# Patient Record
Sex: FEMALE | Race: BLACK OR AFRICAN AMERICAN | NOT HISPANIC OR LATINO | Employment: UNEMPLOYED | URBAN - METROPOLITAN AREA
[De-identification: names, ages, dates, MRNs, and addresses within clinical notes are randomized per-mention and may not be internally consistent; named-entity substitution may affect disease eponyms.]

---

## 2024-05-22 ENCOUNTER — HOSPITAL ENCOUNTER (OUTPATIENT)
Facility: CLINIC | Age: 39
Setting detail: OBSERVATION
Discharge: HOME | End: 2024-05-23
Attending: EMERGENCY MEDICINE | Admitting: HOSPITALIST
Payer: MEDICAID

## 2024-05-22 ENCOUNTER — APPOINTMENT (OUTPATIENT)
Dept: GENERAL RADIOLOGY | Facility: CLINIC | Age: 39
End: 2024-05-22
Attending: NURSE PRACTITIONER
Payer: MEDICAID

## 2024-05-22 DIAGNOSIS — R13.10 DYSPHAGIA, UNSPECIFIED TYPE: ICD-10-CM

## 2024-05-22 DIAGNOSIS — R79.89 ELEVATED TSH: ICD-10-CM

## 2024-05-22 DIAGNOSIS — D50.9 MICROCYTIC ANEMIA: ICD-10-CM

## 2024-05-22 LAB
ABO/RH(D): NORMAL
ACANTHOCYTES BLD QL SMEAR: ABNORMAL
ACANTHOCYTES BLD QL SMEAR: ABNORMAL
ALBUMIN SERPL BCG-MCNC: 4.2 G/DL (ref 3.5–5.2)
ALBUMIN UR-MCNC: NEGATIVE MG/DL
ALP SERPL-CCNC: 63 U/L (ref 40–150)
ALT SERPL W P-5'-P-CCNC: <5 U/L (ref 0–50)
ANION GAP SERPL CALCULATED.3IONS-SCNC: 11 MMOL/L (ref 7–15)
ANTIBODY SCREEN: NEGATIVE
APPEARANCE UR: CLEAR
AST SERPL W P-5'-P-CCNC: 15 U/L (ref 0–45)
AUER BODIES BLD QL SMEAR: ABNORMAL
AUER BODIES BLD QL SMEAR: ABNORMAL
BACTERIA #/AREA URNS HPF: ABNORMAL /HPF
BASO STIPL BLD QL SMEAR: ABNORMAL
BASO STIPL BLD QL SMEAR: ABNORMAL
BASOPHILS # BLD AUTO: 0 10E3/UL (ref 0–0.2)
BASOPHILS # BLD AUTO: 0 10E3/UL (ref 0–0.2)
BASOPHILS NFR BLD AUTO: 0 %
BASOPHILS NFR BLD AUTO: 1 %
BILIRUB SERPL-MCNC: 0.7 MG/DL
BILIRUB UR QL STRIP: NEGATIVE
BITE CELLS BLD QL SMEAR: ABNORMAL
BITE CELLS BLD QL SMEAR: ABNORMAL
BLD PROD TYP BPU: NORMAL
BLISTER CELLS BLD QL SMEAR: ABNORMAL
BLISTER CELLS BLD QL SMEAR: ABNORMAL
BLOOD COMPONENT TYPE: NORMAL
BUN SERPL-MCNC: 9.3 MG/DL (ref 6–20)
BURR CELLS BLD QL SMEAR: ABNORMAL
BURR CELLS BLD QL SMEAR: ABNORMAL
CALCIUM SERPL-MCNC: 8.5 MG/DL (ref 8.6–10)
CHLORIDE SERPL-SCNC: 104 MMOL/L (ref 98–107)
CODING SYSTEM: NORMAL
COLOR UR AUTO: ABNORMAL
CREAT SERPL-MCNC: 0.48 MG/DL (ref 0.51–0.95)
CROSSMATCH: NORMAL
DACRYOCYTES BLD QL SMEAR: ABNORMAL
DACRYOCYTES BLD QL SMEAR: ABNORMAL
DEPRECATED HCO3 PLAS-SCNC: 21 MMOL/L (ref 22–29)
EGFRCR SERPLBLD CKD-EPI 2021: >90 ML/MIN/1.73M2
ELLIPTOCYTES BLD QL SMEAR: SLIGHT
ELLIPTOCYTES BLD QL SMEAR: SLIGHT
EOSINOPHIL # BLD AUTO: 0.1 10E3/UL (ref 0–0.7)
EOSINOPHIL # BLD AUTO: 0.1 10E3/UL (ref 0–0.7)
EOSINOPHIL NFR BLD AUTO: 1 %
EOSINOPHIL NFR BLD AUTO: 2 %
ERYTHROCYTE [DISTWIDTH] IN BLOOD BY AUTOMATED COUNT: 22.4 % (ref 10–15)
ERYTHROCYTE [DISTWIDTH] IN BLOOD BY AUTOMATED COUNT: 25.6 % (ref 10–15)
FERRITIN SERPL-MCNC: 3 NG/ML (ref 6–175)
FRAGMENTS BLD QL SMEAR: ABNORMAL
FRAGMENTS BLD QL SMEAR: ABNORMAL
GLUCOSE SERPL-MCNC: 99 MG/DL (ref 70–99)
GLUCOSE UR STRIP-MCNC: NEGATIVE MG/DL
HCG UR QL: NEGATIVE
HCT VFR BLD AUTO: 25 % (ref 35–47)
HCT VFR BLD AUTO: 26.6 % (ref 35–47)
HGB BLD-MCNC: 6.1 G/DL (ref 11.7–15.7)
HGB BLD-MCNC: 6.7 G/DL (ref 11.7–15.7)
HGB BLD-MCNC: 7 G/DL (ref 11.7–15.7)
HGB C CRYSTALS: ABNORMAL
HGB C CRYSTALS: ABNORMAL
HGB UR QL STRIP: NEGATIVE
HOLD SPECIMEN: NORMAL
HOWELL-JOLLY BOD BLD QL SMEAR: ABNORMAL
HOWELL-JOLLY BOD BLD QL SMEAR: ABNORMAL
IMM GRANULOCYTES # BLD: 0 10E3/UL
IMM GRANULOCYTES # BLD: 0 10E3/UL
IMM GRANULOCYTES NFR BLD: 0 %
IMM GRANULOCYTES NFR BLD: 0 %
IRON BINDING CAPACITY (ROCHE): 406 UG/DL (ref 240–430)
IRON SATN MFR SERPL: 3 % (ref 15–46)
IRON SERPL-MCNC: 14 UG/DL (ref 37–145)
ISSUE DATE AND TIME: NORMAL
ISSUE DATE AND TIME: NORMAL
KETONES UR STRIP-MCNC: NEGATIVE MG/DL
LEUKOCYTE ESTERASE UR QL STRIP: NEGATIVE
LYMPHOCYTES # BLD AUTO: 2.5 10E3/UL (ref 0.8–5.3)
LYMPHOCYTES # BLD AUTO: 2.7 10E3/UL (ref 0.8–5.3)
LYMPHOCYTES NFR BLD AUTO: 47 %
LYMPHOCYTES NFR BLD AUTO: 55 %
MCH RBC QN AUTO: 12.8 PG (ref 26.5–33)
MCH RBC QN AUTO: 14 PG (ref 26.5–33)
MCHC RBC AUTO-ENTMCNC: 24.4 G/DL (ref 31.5–36.5)
MCHC RBC AUTO-ENTMCNC: 25.2 G/DL (ref 31.5–36.5)
MCV RBC AUTO: 52 FL (ref 78–100)
MCV RBC AUTO: 56 FL (ref 78–100)
MONOCYTES # BLD AUTO: 0.4 10E3/UL (ref 0–1.3)
MONOCYTES # BLD AUTO: 0.5 10E3/UL (ref 0–1.3)
MONOCYTES NFR BLD AUTO: 11 %
MONOCYTES NFR BLD AUTO: 7 %
MUCOUS THREADS #/AREA URNS LPF: PRESENT /LPF
NEUTROPHILS # BLD AUTO: 1.5 10E3/UL (ref 1.6–8.3)
NEUTROPHILS # BLD AUTO: 2.3 10E3/UL (ref 1.6–8.3)
NEUTROPHILS NFR BLD AUTO: 32 %
NEUTROPHILS NFR BLD AUTO: 44 %
NEUTS HYPERSEG BLD QL SMEAR: ABNORMAL
NEUTS HYPERSEG BLD QL SMEAR: ABNORMAL
NITRATE UR QL: NEGATIVE
NRBC # BLD AUTO: 0 10E3/UL
NRBC # BLD AUTO: 0 10E3/UL
NRBC BLD AUTO-RTO: 0 /100
NRBC BLD AUTO-RTO: 0 /100
PH UR STRIP: 7.5 [PH] (ref 5–7)
PLAT MORPH BLD: ABNORMAL
PLAT MORPH BLD: ABNORMAL
PLATELET # BLD AUTO: 156 10E3/UL (ref 150–450)
PLATELET # BLD AUTO: 156 10E3/UL (ref 150–450)
POLYCHROMASIA BLD QL SMEAR: SLIGHT
POLYCHROMASIA BLD QL SMEAR: SLIGHT
POTASSIUM SERPL-SCNC: 3.5 MMOL/L (ref 3.4–5.3)
PROT SERPL-MCNC: 8.2 G/DL (ref 6.4–8.3)
RBC # BLD AUTO: 4.77 10E6/UL (ref 3.8–5.2)
RBC # BLD AUTO: 4.78 10E6/UL (ref 3.8–5.2)
RBC AGGLUT BLD QL: ABNORMAL
RBC AGGLUT BLD QL: ABNORMAL
RBC MORPH BLD: ABNORMAL
RBC MORPH BLD: ABNORMAL
RBC URINE: 0 /HPF
RETICS # AUTO: 0.05 10E6/UL (ref 0.03–0.1)
RETICS/RBC NFR AUTO: 1.1 % (ref 0.5–2)
ROULEAUX BLD QL SMEAR: ABNORMAL
ROULEAUX BLD QL SMEAR: ABNORMAL
SICKLE CELLS BLD QL SMEAR: ABNORMAL
SICKLE CELLS BLD QL SMEAR: ABNORMAL
SMUDGE CELLS BLD QL SMEAR: ABNORMAL
SMUDGE CELLS BLD QL SMEAR: PRESENT
SODIUM SERPL-SCNC: 136 MMOL/L (ref 135–145)
SP GR UR STRIP: 1.01 (ref 1–1.03)
SPECIMEN EXPIRATION DATE: NORMAL
SPHEROCYTES BLD QL SMEAR: ABNORMAL
SPHEROCYTES BLD QL SMEAR: ABNORMAL
SQUAMOUS EPITHELIAL: 2 /HPF
STOMATOCYTES BLD QL SMEAR: ABNORMAL
STOMATOCYTES BLD QL SMEAR: ABNORMAL
TARGETS BLD QL SMEAR: ABNORMAL
TARGETS BLD QL SMEAR: ABNORMAL
TOXIC GRANULES BLD QL SMEAR: ABNORMAL
TOXIC GRANULES BLD QL SMEAR: ABNORMAL
TRANSFERRIN SERPL-MCNC: 324 MG/DL (ref 200–360)
UNIT ABO/RH: NORMAL
UNIT NUMBER: NORMAL
UNIT STATUS: NORMAL
UNIT TYPE ISBT: 5100
UROBILINOGEN UR STRIP-MCNC: NORMAL MG/DL
VARIANT LYMPHS BLD QL SMEAR: ABNORMAL
VARIANT LYMPHS BLD QL SMEAR: ABNORMAL
VIT B12 SERPL-MCNC: 352 PG/ML (ref 232–1245)
WBC # BLD AUTO: 4.9 10E3/UL (ref 4–11)
WBC # BLD AUTO: 5.3 10E3/UL (ref 4–11)
WBC URINE: <1 /HPF

## 2024-05-22 PROCEDURE — 85060 BLOOD SMEAR INTERPRETATION: CPT | Performed by: PATHOLOGY

## 2024-05-22 PROCEDURE — 36430 TRANSFUSION BLD/BLD COMPNT: CPT

## 2024-05-22 PROCEDURE — 81001 URINALYSIS AUTO W/SCOPE: CPT | Performed by: EMERGENCY MEDICINE

## 2024-05-22 PROCEDURE — 36415 COLL VENOUS BLD VENIPUNCTURE: CPT | Performed by: NURSE PRACTITIONER

## 2024-05-22 PROCEDURE — 99418 PROLNG IP/OBS E/M EA 15 MIN: CPT | Performed by: NURSE PRACTITIONER

## 2024-05-22 PROCEDURE — 85025 COMPLETE CBC W/AUTO DIFF WBC: CPT | Performed by: EMERGENCY MEDICINE

## 2024-05-22 PROCEDURE — P9016 RBC LEUKOCYTES REDUCED: HCPCS | Performed by: EMERGENCY MEDICINE

## 2024-05-22 PROCEDURE — 85025 COMPLETE CBC W/AUTO DIFF WBC: CPT | Performed by: NURSE PRACTITIONER

## 2024-05-22 PROCEDURE — 86923 COMPATIBILITY TEST ELECTRIC: CPT | Performed by: NURSE PRACTITIONER

## 2024-05-22 PROCEDURE — 86900 BLOOD TYPING SEROLOGIC ABO: CPT | Performed by: EMERGENCY MEDICINE

## 2024-05-22 PROCEDURE — 99285 EMERGENCY DEPT VISIT HI MDM: CPT | Mod: 25

## 2024-05-22 PROCEDURE — G0378 HOSPITAL OBSERVATION PER HR: HCPCS

## 2024-05-22 PROCEDURE — 85045 AUTOMATED RETICULOCYTE COUNT: CPT | Performed by: NURSE PRACTITIONER

## 2024-05-22 PROCEDURE — 85018 HEMOGLOBIN: CPT | Mod: 91 | Performed by: PHYSICIAN ASSISTANT

## 2024-05-22 PROCEDURE — 99223 1ST HOSP IP/OBS HIGH 75: CPT | Performed by: NURSE PRACTITIONER

## 2024-05-22 PROCEDURE — 80053 COMPREHEN METABOLIC PANEL: CPT | Performed by: EMERGENCY MEDICINE

## 2024-05-22 PROCEDURE — 83550 IRON BINDING TEST: CPT | Performed by: NURSE PRACTITIONER

## 2024-05-22 PROCEDURE — 86901 BLOOD TYPING SEROLOGIC RH(D): CPT | Performed by: EMERGENCY MEDICINE

## 2024-05-22 PROCEDURE — 82607 VITAMIN B-12: CPT | Performed by: NURSE PRACTITIONER

## 2024-05-22 PROCEDURE — 85018 HEMOGLOBIN: CPT | Mod: 91 | Performed by: NURSE PRACTITIONER

## 2024-05-22 PROCEDURE — 81025 URINE PREGNANCY TEST: CPT | Performed by: EMERGENCY MEDICINE

## 2024-05-22 PROCEDURE — 84466 ASSAY OF TRANSFERRIN: CPT | Performed by: NURSE PRACTITIONER

## 2024-05-22 PROCEDURE — 71046 X-RAY EXAM CHEST 2 VIEWS: CPT

## 2024-05-22 PROCEDURE — 36415 COLL VENOUS BLD VENIPUNCTURE: CPT | Performed by: EMERGENCY MEDICINE

## 2024-05-22 PROCEDURE — 82728 ASSAY OF FERRITIN: CPT | Performed by: NURSE PRACTITIONER

## 2024-05-22 PROCEDURE — 36415 COLL VENOUS BLD VENIPUNCTURE: CPT | Performed by: PHYSICIAN ASSISTANT

## 2024-05-22 PROCEDURE — 86923 COMPATIBILITY TEST ELECTRIC: CPT | Performed by: EMERGENCY MEDICINE

## 2024-05-22 RX ORDER — ONDANSETRON 4 MG/1
4 TABLET, ORALLY DISINTEGRATING ORAL EVERY 6 HOURS PRN
Status: DISCONTINUED | OUTPATIENT
Start: 2024-05-22 | End: 2024-05-23 | Stop reason: HOSPADM

## 2024-05-22 RX ORDER — PROCHLORPERAZINE MALEATE 5 MG
10 TABLET ORAL EVERY 6 HOURS PRN
Status: DISCONTINUED | OUTPATIENT
Start: 2024-05-22 | End: 2024-05-23 | Stop reason: HOSPADM

## 2024-05-22 RX ORDER — AMOXICILLIN 250 MG
2 CAPSULE ORAL 2 TIMES DAILY PRN
Status: DISCONTINUED | OUTPATIENT
Start: 2024-05-22 | End: 2024-05-23 | Stop reason: HOSPADM

## 2024-05-22 RX ORDER — ACETAMINOPHEN 650 MG/1
650 SUPPOSITORY RECTAL EVERY 4 HOURS PRN
Status: DISCONTINUED | OUTPATIENT
Start: 2024-05-22 | End: 2024-05-23 | Stop reason: HOSPADM

## 2024-05-22 RX ORDER — AMOXICILLIN 250 MG
1 CAPSULE ORAL 2 TIMES DAILY PRN
Status: DISCONTINUED | OUTPATIENT
Start: 2024-05-22 | End: 2024-05-23 | Stop reason: HOSPADM

## 2024-05-22 RX ORDER — ACETAMINOPHEN 325 MG/1
650 TABLET ORAL EVERY 4 HOURS PRN
Status: DISCONTINUED | OUTPATIENT
Start: 2024-05-22 | End: 2024-05-23 | Stop reason: HOSPADM

## 2024-05-22 RX ORDER — ONDANSETRON 2 MG/ML
4 INJECTION INTRAMUSCULAR; INTRAVENOUS EVERY 6 HOURS PRN
Status: DISCONTINUED | OUTPATIENT
Start: 2024-05-22 | End: 2024-05-23 | Stop reason: HOSPADM

## 2024-05-22 RX ORDER — PROCHLORPERAZINE 25 MG
25 SUPPOSITORY, RECTAL RECTAL EVERY 12 HOURS PRN
Status: DISCONTINUED | OUTPATIENT
Start: 2024-05-22 | End: 2024-05-23 | Stop reason: HOSPADM

## 2024-05-22 ASSESSMENT — ACTIVITIES OF DAILY LIVING (ADL)
ADLS_ACUITY_SCORE: 25
ADLS_ACUITY_SCORE: 37
ADLS_ACUITY_SCORE: 25
ADLS_ACUITY_SCORE: 33
ADLS_ACUITY_SCORE: 25
ADLS_ACUITY_SCORE: 37
ADLS_ACUITY_SCORE: 25

## 2024-05-22 ASSESSMENT — COLUMBIA-SUICIDE SEVERITY RATING SCALE - C-SSRS
2. HAVE YOU ACTUALLY HAD ANY THOUGHTS OF KILLING YOURSELF IN THE PAST MONTH?: NO
6. HAVE YOU EVER DONE ANYTHING, STARTED TO DO ANYTHING, OR PREPARED TO DO ANYTHING TO END YOUR LIFE?: NO
1. IN THE PAST MONTH, HAVE YOU WISHED YOU WERE DEAD OR WISHED YOU COULD GO TO SLEEP AND NOT WAKE UP?: NO

## 2024-05-22 NOTE — PHARMACY-ADMISSION MEDICATION HISTORY
Pharmacist Admission Medication History    Admission medication history is complete. The information provided in this note is only as accurate as the sources available at the time of the update.    Information Source(s): Patient, Family member, and CareEverywhere/SureScripts via in-person    Pertinent Information: Pt denies using Rx or OTC medications PTA.    Changes made to PTA medication list:  Added: None  Deleted: None  Changed: None    Allergies reviewed with patient and updates made in EHR: yes    Medication History Completed By: Beatris Cordova RPH 5/22/2024 2:19 PM    No outpatient medications have been marked as taking for the 5/22/24 encounter (Hospital Encounter).

## 2024-05-22 NOTE — ED PROVIDER NOTES
"    History     Chief Complaint:  Abnormal Labs       HPI   Dorothy Aiken is a 39 year old female here for callback for abnormal blood work.  Per patient and family was being seen for chronic dysphagia and chronic dry itchy rash and had blood drawn.  Told hemoglobin low and needs to be seen in the ER.  In her past especially related to pregnancy she needed iron infusions for iron deficiency anemia.  She denies any CP SOB fever vomiting vomiting blood abdominal pain dysuria hematuria.        Independent Historian:    Family    Review of External Notes:  Allyssa Oakley MD - 05/21/2024 1:20 PM CDT  Formatting of this note is different from the original.  Images from the original note were not included.  Nursing Notes:  Vicki Rojas 5/21/2024 1:29 PM Signed  Chief Complaint  Patient presents with  Throat Problem    Additional visit information (chief complaint/health maintenance) shared by patient: throat feels \"pressure\", hard time swallowing, clearing throat a lot.  Skin rash on left arm and face. Has been seen in the past for this and cream used did not work.  Health Maintenance Due  Topic Date Due  Depression screening for age 12+ Never done  HIV for age 15-65 Never done  Hepatitis C screening for age 18-79 Never done  BMI (ht and wt on same day) for age 18+ Never done  Pap test for age 21-65 Never done  COVID-19 vaccine series (2 - 2023-24 season) 09/01/2023    Health maintenance reviewed with patient Yes    Patient presents for an in-person office visit: with English   Communication Method: Patient is not active on [employer] and is aware they will receive their results/communications via phone call  If a phone call is needed, the preferred number is: Mobile  Home Phone [phone]  Mobile [phone]   Dorothy Montespresented today with Throat Problem and Derm Problem  . They are an established patient of this clinic who I have not seen before.     Dysphagia, unspecified type " (Primary)  - CBC W PLT NO DIFF; Future  - TSH WITH REFLEX; Future  - COMP METABOLIC PANEL; Future  - AMB CONSULT TO GASTROENTEROLOGY --select region--; Future  - CBC W PLT NO DIFF  - TSH WITH REFLEX  - COMP METABOLIC PANEL  - RED CELL MORPHOLOGY  - PLATELET ESTIMATE  - CWS PATH REVIEW HEMATOLOGY    Unintentional weight loss  - CBC W PLT NO DIFF; Future  - TSH WITH REFLEX; Future  - COMP METABOLIC PANEL; Future  - AMB CONSULT TO GASTROENTEROLOGY --select region--; Future  - CBC W PLT NO DIFF  - TSH WITH REFLEX  - COMP METABOLIC PANEL  - RED CELL MORPHOLOGY  - PLATELET ESTIMATE  - CWS PATH REVIEW HEMATOLOGY    Pityriasis rosea  - AMB CONSULT TO DERMATOLOGY; Future      Patient presenting for 4 years of progressive dysphagia which has resulted in some episodes of choking and pain. It is limited to solid foods, no dysphagia to liquids. Per patient, food gets trapped in the mid-throat. It has started to become painful. Patient also endorses significant weight loss (50#) in the last 6 months. Given progressive dysphagia and weight loss, will get patient in for GI consult.     did check labs today and received critical alert for hemoglobin of 6.3. Patient without significant dyspnea, however full ROS for anemia not completed at our visit given chief complaint of dysphagia. Patient called by RN team to recommend ER evaluation, which patient's daughter was in agreement with.     Will review ER notes when they are available to help with follow up planning.    Allyssa Brock MD   Medications:    No current outpatient medications on file.      Past Medical History:    No past medical history on file.    Past Surgical History:    No past surgical history on file.       Physical Exam   Patient Vitals for the past 24 hrs:   BP Temp Temp src Pulse Resp SpO2 Height Weight   05/22/24 1400 102/67 -- -- 74 -- 100 % -- --   05/22/24 1314 103/55 -- -- 70 -- 100 % -- --   05/22/24 1235 110/64 -- -- -- -- -- -- --   05/22/24  "1234 -- -- -- -- -- -- 1.6 m (5' 3\") 83.1 kg (183 lb 3.2 oz)   05/22/24 1230 -- 97.4  F (36.3  C) Temporal 84 18 100 % -- --        Physical Exam  General: Patient is well appearing. No distress.  Head: Atraumatic.  Eyes: Conjunctivae and EOM are normal. No scleral icterus.  Neck: Normal range of motion. Neck supple.   Cardiovascular: Normal rate, regular rhythm, normal heart sounds and intact distal pulses.   Pulmonary/Chest: Breath sounds normal. No respiratory distress.  Abdominal: Soft. Bowel sounds are normal. No distension. No tenderness. No rebound or guarding.   Musculoskeletal: Normal range of motion.  Skin: Warm and dry. No rash noted. Not diaphoretic.      Emergency Department Course   ECG      Imaging:  No orders to display       Laboratory:  Labs Ordered and Resulted from Time of ED Arrival to Time of ED Departure   COMPREHENSIVE METABOLIC PANEL - Abnormal       Result Value    Sodium 136      Potassium 3.5      Carbon Dioxide (CO2) 21 (*)     Anion Gap 11      Urea Nitrogen 9.3      Creatinine 0.48 (*)     GFR Estimate >90      Calcium 8.5 (*)     Chloride 104      Glucose 99      Alkaline Phosphatase 63      AST 15      ALT <5      Protein Total 8.2      Albumin 4.2      Bilirubin Total 0.7     RBC AND PLATELET MORPHOLOGY - Abnormal    Platelet Assessment        Value: Automated Count Confirmed. Platelet morphology is normal.    Acanthocytes        Jovani Rods        Basophilic Stippling        Bite Cells        Blister Cells        Reed Cells        Elliptocytes Slight (*)     Hgb C Crystals        Luther-Jolly Bodies        Hypersegmented Neutrophils        Polychromasia Slight (*)     RBC agglutination        RBC Fragments        Reactive Lymphocytes        Rouleaux        Sickle Cells        Smudge Cells Present (*)     Spherocytes        Stomatocytes        Target Cells        Teardrop Cells        Toxic Neutrophils        RBC Morphology Confirmed RBC Indices     ROUTINE UA WITH MICROSCOPIC REFLEX " TO CULTURE   HCG QUALITATIVE URINE   CBC WITH PLATELETS AND DIFFERENTIAL   TYPE AND SCREEN, ADULT    ABO/RH(D) O POS      Antibody Screen Negative      SPECIMEN EXPIRATION DATE [id number]     PREPARE RED BLOOD CELLS (UNIT)    Blood Component Type Red Blood Cells      Product Code I5489B19      Unit Status Ready for issue      Unit Number [id number]      CROSSMATCH Compatible      CODING SYSTEM [id number]     PREPARE RED BLOOD CELLS (UNIT)    Blood Component Type Red Blood Cells      Product Code S8368I72      Unit Status Ready for issue      Unit Number [id number]      CROSSMATCH Compatible      CODING SYSTEM [id number]     PREPARE RED BLOOD CELLS (UNIT)   ABO/RH TYPE AND SCREEN        Procedures       Emergency Department Course & Assessments:    Interventions:  Medications - No data to display     Assessments:      Independent Interpretation (X-rays, CTs, rhythm strip):      Consultations/Discussion of Management or Tests:         Social Determinants of Health affecting care:       Disposition:  Obs    Impression & Plan           Medical Decision Making:  Pt with iron deficiency anemia with microcytic anemia to the level of 6 at clinic yesterday.  No signs of active end organ ROS dysfxn or new symptoms today.  Actually patient has no complaints today and states she would not be in the ER is they had not called.  Plan was for GI consult and derm consult.  Considering hgb of 6 likely profound iron deficiency has dysphagia upper GI sx may be useful to obs in hospital address the profound anemia with transfusions possibly consult GI for endoscopy and address elevated TSH and the diet endocrine side of this.      Diagnosis:    ICD-10-CM    1. Dysphagia, unspecified type  R13.10       2. Microcytic anemia  D50.9       3. Elevated TSH  R79.89            Discharge Medications:  New Prescriptions    No medications on file          \  5/22/2024   Todd Dan MD Stevens, Andrew C, MD  05/22/24  0589

## 2024-05-22 NOTE — ED TRIAGE NOTES
Pt presents to ED due to low hemoglobin. States she was seen at [facility] yesterday and was called back and told her hgb was low and to go to the ED. Pt did not come last night due to the weather. Denies bloody or black stool. Denies SOB. Felt a little tired yesterday, but feels well today. Hx of low iron and requiring blood transfusion in 2019. Does not take iron supplements currently.

## 2024-05-22 NOTE — H&P
"[facility]    History and Physical - Hospitalist Service       Date of Admission:  5/22/2024    Assessment & Plan      Dorothy Aiken is a 39 year old female admitted on 5/22/2024. The patient has a history of iron deficiency anemia during pregnancies, requiring iron infusions. She also has a 4-year history of dysphagia and a 1-year history of systemic itching with associated hair loss that prompted her to visit her PCP yesterday. The patient was seen in the clinic yesterday for \"4 years of progressive dysphagia which has resulted in some episodes of choking and pain. It is limited to solid foods, no dysphagia with liquids. Per patient, food gets trapped in the mid-throat. It has started to become painful. Patient also endorses significant weight loss (50#) in the last 6 months.\"   Laboratory tests yesterday revealed iron deficiency anemia, presenting as microcytic anemia to the level of 6.3, recheck today is 6.1. She exhibits no signs of active end organ dysfunction or new symptoms today. The patient has no complaints today and states she would not be in the ER if they had not called. Plan was for outpatient GI consult and derm consult.     #Iron deficiency anemia: likely chronic and exacerbated by lack of daily iron supplementation.  - Tranfuse 1 unit PRBCs. Recheck hgb 1 hour post-transfusion  - If repeat hgb is <7.0, tranfuse 1 addition unit PRBCs, then recheck hgb 1 hour after transfusion.  - Monitor for signs of bleeding  - Consider adding daily iron supplementation   - Patient and family educated on iron-rich food sources  - B12, ferritin, transferrin, iron binding capacity, and peripheral smear ordered  - CBC in AM    #Subclinical Hypothyroidism: TSH 6.06, T4 1.03 on 05/21/24  - No known hx of hypothyroidism, per pt  - Does note decreased heat tolerance since 01/2024  - No synthroid indication at this time, continue to monitor    #Chronic dysphagia: cause yet to be determined, " "requires further investigation.  - Monitor for choking, dysphagia  - Regular diet  - Plan for outpatient GI follow-up, as recommended by Dr. Allyssa Brock MD.    #Chronic systemic itching with hair loss-- suspect this is related to iron-deficiency anemia: cause yet to be determined, dermatology consultation needed.     # Obesity: Estimated body mass index is 32.45 kg/m  as calculated from the following:    Height as of this encounter: 1.6 m (5' 3\").    Weight as of this encounter: 83.1 kg (183 lb 3.2 oz).    #Chronic back pain  - Discomfort on palpation of the left thoracic area, has been present for the last 1 year  - Pain is difficult to reproduced  - CXR per pt request  - Tylenol PRN for pain  - ICE PRN     Diet: Combination regular diet  DVT Prophylaxis: Pneumatic Compression Devices and Ambulate every shift  Littlejohn Catheter: Not present  Lines: None     Cardiac Monitoring: None  Code Status:  Full-code    Clinically Significant Risk Factors Present on Admission                       # Obesity: Estimated body mass index is 32.45 kg/m  as calculated from the following:    Height as of this encounter: 1.6 m (5' 3\").    Weight as of this encounter: 83.1 kg (183 lb 3.2 oz).              Disposition Plan  per clinical course    Medically Ready for Discharge: Anticipated Tomorrow         The patient's care was discussed with the Bedside Nurse, Patient, and Patient's Family.    Nya Beavers, NP-s  [facility]    CHUN Fontenot CNP  Hospitalist Service  [facility]  Securely message with [employer] (more info)  Text page via [employer] Paging/Directory       Attestation: 5/22/2024 5:17 PM     \"I was present with the student who participated in the service and in the documentation of the note. I have verified the history and personally performed the physical exam and medical decision-making. I agree with the assessment and plan of care as documented in the note.\"     Vasile Fernandez " "CHUN Fontana CNP  _________________________________________________________________    Chief Complaint   Abnormal Labs - hgb 6.3 yesterday--> 6.1 today  TSH 6.06 - no known hx of hypothyroidism    History is obtained from the patient and patient's family.    History of Present Illness   Dorothy Aiken is a 39 year old female who presented to her primary care clinic yesterday with concerns of dysphagia (4 years), pruritus (1 year), and minor hair loss. Laboratory revealed suspected iron-deficiency anemia and hypothyroidism. Pt denies chest pain, shortness of breath, fever, vomiting, blood in vomit, abdominal pain, dysuria, and hematuria. Pt denies any abnormal bleeding, changes in medications, or lifestyle. Pt's family notes her diet is \"pretty healthy\" and she avoids a lot of junk and processed foods. Pt's daughter notes the pt has increased stress and grief after laying their father to rest,last year.     Past Medical History    Iron deficiency anemia    Past Surgical History   No past surgical history on file.    Prior to Admission Medications   None        Allergies   No Known Allergies     Physical Exam   Vital Signs: Temp: 97.4  F (36.3  C) Temp src: Temporal BP: 107/77 Pulse: 69   Resp: 18 SpO2: 100 %      Weight: 183 lbs 3.24 oz  GEN:   Alert, oriented x 3, appears comfortable, NAD.  NECK:  Supple, no mass or profound thyromegaly   HEENT: Normocephalic/atraumatic, no scleral icterus, no nasal discharge, mouth moist.  CV:   Regular rate and rhythm, no murmur or JVD.  S1 + S2 noted, no S3 or S4.  LUNGS:   Clear to auscultation bilaterally without rales/rhonchi/wheezing/retractions.  Symmetric chest rise on inhalation noted.  ABD:   Active bowel sounds, soft, non-tender/non-distended.  No rebound/guarding/rigidity.  EXT:   No edema.  No cyanosis.  No joint synovitis noted.  SKIN:   Dry to touch. Scattered dry rash.   Neurologic: Grossly intact,non focal.  Neuropsychiatric:  General: normal, calm and normal " eye contact  Level of consciousness: alert / normal  Affect: normal  Orientation: oriented to self, place, time and situation         Medical Decision Making       90 MINUTES SPENT BY ME on the date of service doing chart review, history, exam, documentation & further activities per the note.      Data     I have personally reviewed the following data over the past 24 hrs:    4.9  \   6.1 (LL)   / 156     136 104 9.3 /  99   3.5 21 (L) 0.48 (L) \     ALT: <5 AST: 15 AP: 63 TBILI: 0.7   ALB: 4.2 TOT PROTEIN: 8.2 LIPASE: N/A       Imaging results reviewed over the past 24 hrs:   No results found for this or any previous visit (from the past 24 hour(s)).

## 2024-05-22 NOTE — ED NOTES
[facility]  ED Nurse Handoff Report    ED Chief complaint: Abnormal Labs  . ED Diagnosis:   Final diagnoses:   Dysphagia, unspecified type   Microcytic anemia   Elevated TSH       Allergies: No Known Allergies    Code Status: None on file    Activity level - Baseline/Home:  independent.  Activity Level - Current:   standby.   Lift room needed: No.   Bariatric: No   Needed: No Daughter is interpreting for her  Isolation: No.   Infection: Not Applicable.     Respiratory status: Room air    Vital Signs (within 30 minutes):   Vitals:    05/22/24 1431 05/22/24 1441 05/22/24 1501 05/22/24 1531   BP: 103/59 107/63 107/77 117/58   Pulse: 71 58 69 70   Resp:    18   Temp:    98.3  F (36.8  C)   TempSrc:       SpO2: 100% 100% 100%    Weight:       Height:           Cardiac Rhythm:  ,   Cardiac  Cardiac Rhythm: Normal sinus rhythm  Pain level:    Patient confused: No.   Patient Falls Risk: patient and family education, assistive device/personal items within reach, and activity supervised.   Elimination Status:  Not yet in ED      Patient Report - Initial Complaint: Abnormal labs.   Focused Assessment: Pt reports feeling weak and shortness of breath with activity. She reports history of weakness and low hemoglobin. Denies CP, N/V, dizziness.      Abnormal Results:   Labs Ordered and Resulted from Time of ED Arrival to Time of ED Departure   COMPREHENSIVE METABOLIC PANEL - Abnormal       Result Value    Sodium 136      Potassium 3.5      Carbon Dioxide (CO2) 21 (*)     Anion Gap 11      Urea Nitrogen 9.3      Creatinine 0.48 (*)     GFR Estimate >90      Calcium 8.5 (*)     Chloride 104      Glucose 99      Alkaline Phosphatase 63      AST 15      ALT <5      Protein Total 8.2      Albumin 4.2      Bilirubin Total 0.7     CBC WITH PLATELETS AND DIFFERENTIAL - Abnormal    WBC Count 4.9      RBC Count 4.78      Hemoglobin 6.1 (*)     Hematocrit 25.0 (*)     MCV 52 (*)     MCH 12.8 (*)     MCHC  24.4 (*)     RDW 22.4 (*)     Platelet Count 156      % Neutrophils 32      % Lymphocytes 55      % Monocytes 11      % Eosinophils 2      % Basophils 0      % Immature Granulocytes 0      NRBCs per 100 WBC 0      Absolute Neutrophils 1.5 (*)     Absolute Lymphocytes 2.7      Absolute Monocytes 0.5      Absolute Eosinophils 0.1      Absolute Basophils 0.0      Absolute Immature Granulocytes 0.0      Absolute NRBCs 0.0     RBC AND PLATELET MORPHOLOGY - Abnormal    Platelet Assessment        Value: Automated Count Confirmed. Platelet morphology is normal.    Acanthocytes        Jovani Rods        Basophilic Stippling        Bite Cells        Blister Cells        Elva Cells        Elliptocytes Slight (*)     Hgb C Crystals        Luther-Jolly Bodies        Hypersegmented Neutrophils        Polychromasia Slight (*)     RBC agglutination        RBC Fragments        Reactive Lymphocytes        Rouleaux        Sickle Cells        Smudge Cells Present (*)     Spherocytes        Stomatocytes        Target Cells        Teardrop Cells        Toxic Neutrophils        RBC Morphology Confirmed RBC Indices     ROUTINE UA WITH MICROSCOPIC REFLEX TO CULTURE   HCG QUALITATIVE URINE   TYPE AND SCREEN, ADULT    ABO/RH(D) O POS      Antibody Screen Negative      SPECIMEN EXPIRATION DATE [id number]     PREPARE RED BLOOD CELLS (UNIT)    Blood Component Type Red Blood Cells      Product Code U6558Z93      Unit Status Ready for issue      Unit Number [id number]      CROSSMATCH Compatible      CODING SYSTEM [id number]     PREPARE RED BLOOD CELLS (UNIT)    Blood Component Type Red Blood Cells      Product Code E0068F03      Unit Status Transfused      Unit Number [id number]      CROSSMATCH Compatible      CODING SYSTEM [id number]      ISSUE DATE AND TIME [id number]      UNIT ABO/RH O+      UNIT TYPE ISBT 5100     PREPARE RED BLOOD CELLS (UNIT)   TRANSFUSE RED BLOOD CELLS (UNIT)   ABO/RH TYPE AND SCREEN        No orders to display        Treatments provided: Labs, blood transfusion  Family Comments: Daughter at bedside  OBS brochure/video discussed/provided to patient:  Yes  ED Medications: Medications - No data to display    Drips infusing:  Yes Blood  For the majority of the shift this patient was Green.   Interventions performed were N/A.    Sepsis treatment initiated: No    Cares/treatment/interventions/medications to be completed following ED care: finish blood transfusion    ED Nurse Name: Lilli Morales RN  3:38 PM      RECEIVING UNIT ED HANDOFF REVIEW    Above ED Nurse Handoff Report was reviewed: Yes  Reviewed by: Tiffani Babb RN on May 22, 2024 at 3:44 PM   GIO Mendoza called the ED to inform them the note was read: No

## 2024-05-22 NOTE — PLAN OF CARE
ROOM # 203    Living Situation (if not independent, order SW consult): Home with family  Facility name:  : [facility]- daughter    Activity level at baseline: independent  Activity level on admit: stand by with IV     Who will be transporting you at discharge: [facility]    Patient registered to observation; given Patient Bill of Rights; given the opportunity to ask questions about observation status and their plan of care.  Patient has been oriented to the observation room, bathroom and call light is in place.    Discussed discharge goals and expectations with patient/family.

## 2024-05-23 VITALS
SYSTOLIC BLOOD PRESSURE: 104 MMHG | RESPIRATION RATE: 16 BRPM | DIASTOLIC BLOOD PRESSURE: 48 MMHG | HEART RATE: 62 BPM | HEIGHT: 63 IN | TEMPERATURE: 98.1 F | BODY MASS INDEX: 32.46 KG/M2 | WEIGHT: 183.2 LBS | OXYGEN SATURATION: 99 %

## 2024-05-23 LAB
ACANTHOCYTES BLD QL SMEAR: ABNORMAL
ANION GAP SERPL CALCULATED.3IONS-SCNC: 10 MMOL/L (ref 7–15)
AUER BODIES BLD QL SMEAR: ABNORMAL
BASO STIPL BLD QL SMEAR: ABNORMAL
BITE CELLS BLD QL SMEAR: ABNORMAL
BLISTER CELLS BLD QL SMEAR: ABNORMAL
BUN SERPL-MCNC: 9.8 MG/DL (ref 6–20)
BURR CELLS BLD QL SMEAR: ABNORMAL
CALCIUM SERPL-MCNC: 8.4 MG/DL (ref 8.6–10)
CHLORIDE SERPL-SCNC: 107 MMOL/L (ref 98–107)
CREAT SERPL-MCNC: 0.49 MG/DL (ref 0.51–0.95)
DACRYOCYTES BLD QL SMEAR: SLIGHT
DEPRECATED HCO3 PLAS-SCNC: 19 MMOL/L (ref 22–29)
EGFRCR SERPLBLD CKD-EPI 2021: >90 ML/MIN/1.73M2
ELLIPTOCYTES BLD QL SMEAR: SLIGHT
ERYTHROCYTE [DISTWIDTH] IN BLOOD BY AUTOMATED COUNT: 32.4 % (ref 10–15)
FRAGMENTS BLD QL SMEAR: ABNORMAL
GLUCOSE SERPL-MCNC: 101 MG/DL (ref 70–99)
HCT VFR BLD AUTO: 30.4 % (ref 35–47)
HGB BLD-MCNC: 8.2 G/DL (ref 11.7–15.7)
HGB BLD-MCNC: 8.3 G/DL (ref 11.7–15.7)
HGB C CRYSTALS: ABNORMAL
HOWELL-JOLLY BOD BLD QL SMEAR: ABNORMAL
MCH RBC QN AUTO: 15.9 PG (ref 26.5–33)
MCHC RBC AUTO-ENTMCNC: 27 G/DL (ref 31.5–36.5)
MCV RBC AUTO: 59 FL (ref 78–100)
NEUTS HYPERSEG BLD QL SMEAR: ABNORMAL
PLAT MORPH BLD: ABNORMAL
PLATELET # BLD AUTO: 150 10E3/UL (ref 150–450)
POLYCHROMASIA BLD QL SMEAR: ABNORMAL
POTASSIUM SERPL-SCNC: 3.8 MMOL/L (ref 3.4–5.3)
RBC # BLD AUTO: 5.16 10E6/UL (ref 3.8–5.2)
RBC AGGLUT BLD QL: ABNORMAL
RBC MORPH BLD: ABNORMAL
ROULEAUX BLD QL SMEAR: ABNORMAL
SICKLE CELLS BLD QL SMEAR: ABNORMAL
SMUDGE CELLS BLD QL SMEAR: ABNORMAL
SODIUM SERPL-SCNC: 136 MMOL/L (ref 135–145)
SPHEROCYTES BLD QL SMEAR: ABNORMAL
STOMATOCYTES BLD QL SMEAR: ABNORMAL
TARGETS BLD QL SMEAR: ABNORMAL
TOXIC GRANULES BLD QL SMEAR: ABNORMAL
VARIANT LYMPHS BLD QL SMEAR: ABNORMAL
WBC # BLD AUTO: 6.4 10E3/UL (ref 4–11)

## 2024-05-23 PROCEDURE — G0378 HOSPITAL OBSERVATION PER HR: HCPCS

## 2024-05-23 PROCEDURE — 96374 THER/PROPH/DIAG INJ IV PUSH: CPT

## 2024-05-23 PROCEDURE — 36415 COLL VENOUS BLD VENIPUNCTURE: CPT | Performed by: NURSE PRACTITIONER

## 2024-05-23 PROCEDURE — 258N000003 HC RX IP 258 OP 636: Performed by: NURSE PRACTITIONER

## 2024-05-23 PROCEDURE — 99239 HOSP IP/OBS DSCHRG MGMT >30: CPT | Performed by: NURSE PRACTITIONER

## 2024-05-23 PROCEDURE — 250N000011 HC RX IP 250 OP 636: Performed by: NURSE PRACTITIONER

## 2024-05-23 PROCEDURE — 85027 COMPLETE CBC AUTOMATED: CPT | Performed by: NURSE PRACTITIONER

## 2024-05-23 PROCEDURE — 250N000013 HC RX MED GY IP 250 OP 250 PS 637: Performed by: NURSE PRACTITIONER

## 2024-05-23 PROCEDURE — 80048 BASIC METABOLIC PNL TOTAL CA: CPT | Performed by: NURSE PRACTITIONER

## 2024-05-23 RX ORDER — AMMONIUM LACTATE 12 G/100G
LOTION TOPICAL 2 TIMES DAILY
Qty: 225 G | Status: SHIPPED | OUTPATIENT
Start: 2024-05-23

## 2024-05-23 RX ORDER — DIPHENHYDRAMINE HYDROCHLORIDE 50 MG/ML
50 INJECTION INTRAMUSCULAR; INTRAVENOUS
Status: DISCONTINUED | OUTPATIENT
Start: 2024-05-23 | End: 2024-05-23 | Stop reason: HOSPADM

## 2024-05-23 RX ORDER — HYDROXYZINE HYDROCHLORIDE 25 MG/1
25 TABLET, FILM COATED ORAL 3 TIMES DAILY PRN
Qty: 30 TABLET | Refills: 1 | Status: SHIPPED | OUTPATIENT
Start: 2024-05-23

## 2024-05-23 RX ORDER — METHYLPREDNISOLONE SODIUM SUCCINATE 125 MG/2ML
125 INJECTION, POWDER, LYOPHILIZED, FOR SOLUTION INTRAMUSCULAR; INTRAVENOUS
Status: DISCONTINUED | OUTPATIENT
Start: 2024-05-23 | End: 2024-05-23 | Stop reason: HOSPADM

## 2024-05-23 RX ORDER — ONDANSETRON 4 MG/1
4 TABLET, ORALLY DISINTEGRATING ORAL EVERY 6 HOURS PRN
Qty: 15 TABLET | Refills: 0 | Status: SHIPPED | OUTPATIENT
Start: 2024-05-23

## 2024-05-23 RX ORDER — ACETAMINOPHEN 325 MG/1
650 TABLET ORAL EVERY 4 HOURS PRN
COMMUNITY
Start: 2024-05-23

## 2024-05-23 RX ORDER — FERROUS SULFATE 325(65) MG
325 TABLET ORAL
Qty: 30 TABLET | Refills: 3 | Status: SHIPPED | OUTPATIENT
Start: 2024-05-23

## 2024-05-23 RX ORDER — AMOXICILLIN 250 MG
1 CAPSULE ORAL 2 TIMES DAILY PRN
Qty: 60 TABLET | Refills: 1 | Status: SHIPPED | OUTPATIENT
Start: 2024-05-23

## 2024-05-23 RX ADMIN — SENNOSIDES AND DOCUSATE SODIUM 2 TABLET: 50; 8.6 TABLET ORAL at 03:06

## 2024-05-23 RX ADMIN — IRON SUCROSE 300 MG: 20 INJECTION, SOLUTION INTRAVENOUS at 08:59

## 2024-05-23 ASSESSMENT — ACTIVITIES OF DAILY LIVING (ADL)
ADLS_ACUITY_SCORE: 25

## 2024-05-23 NOTE — PLAN OF CARE
"PRIMARY DIAGNOSIS: Microcytic Anemia  OUTPATIENT/OBSERVATION GOALS TO BE MET BEFORE DISCHARGE:  ADLs back to baseline: Yes    Activity and level of assistance: Ambulating independently.    Pain status: Pain free.    Return to near baseline physical activity: Yes     Discharge Planner Nurse   Safe discharge environment identified: Yes  Barriers to discharge: No       Entered by: Nicole Wiley RN 05/23/2024 9:40 AM   Pt is A/Ox4. Denies pain or SOB. Pt is ambulating to bathroom and in room. Daughter is at bedside. Tolerating oral intake. Hgb is 8.3 this morning. Infusing Iron at this time. Possible discharging home today after iron infusion is complete. Will continue to monitor.  Please review provider order for any additional goals.   Nurse to notify provider when observation goals have been met and patient is ready for discharge.  Problem: Adult Inpatient Plan of Care  Goal: Plan of Care Review  Description: The Plan of Care Review/Shift note should be completed every shift.  The Outcome Evaluation is a brief statement about your assessment that the patient is improving, declining, or no change.  This information will be displayed automatically on your shift  note.  Outcome: Adequate for Care Transition  Goal: Patient-Specific Goal (Individualized)  Description: You can add care plan individualizations to a care plan. Examples of Individualization might be:  \"Parent requests to be called daily at 9am for status\", \"I have a hard time hearing out of my right ear\", or \"Do not touch me to wake me up as it startles  me\".  Outcome: Adequate for Care Transition  Goal: Absence of Hospital-Acquired Illness or Injury  Outcome: Adequate for Care Transition  Intervention: Identify and Manage Fall Risk  Recent Flowsheet Documentation  Taken 5/23/2024 0936 by Nicole Wiley, RN  Safety Promotion/Fall Prevention:   clutter free environment maintained   nonskid shoes/slippers when out of bed   activity supervised  Intervention: Prevent " Skin Injury  Recent Flowsheet Documentation  Taken 5/23/2024 0936 by Nicole Wiley RN  Skin Protection: adhesive use limited  Device Skin Pressure Protection: adhesive use limited  Intervention: Prevent and Manage VTE (Venous Thromboembolism) Risk  Recent Flowsheet Documentation  Taken 5/23/2024 0936 by Nicole Wiley RN  VTE Prevention/Management: SCDs (sequential compression devices) off  Intervention: Prevent Infection  Recent Flowsheet Documentation  Taken 5/23/2024 0936 by Nicole Wiley RN  Infection Prevention: single patient room provided  Goal: Optimal Comfort and Wellbeing  Outcome: Adequate for Care Transition  Goal: Readiness for Transition of Care  Outcome: Adequate for Care Transition

## 2024-05-23 NOTE — PLAN OF CARE
"Dx: Microcytic Anemia     /61   Pulse 64   Temp 97.7  F (36.5  C) (Oral)   Resp 16   Ht 1.6 m (5' 3\")   Wt 83.1 kg (183 lb 3.2 oz)   LMP 05/15/2024   SpO2 100%   BMI 32.45 kg/m       A&O x4. Independent/stand by assist with IV. Regular diet tolerated. Reporting no pain. Two blood transfusions during shift. Hgb trending up: 6.1, 6.7, 7.0. Chest xray and UA/HCG completed. Daughter at bedside assisting with translation. Bed alarm on and call light within reach.     OUTPATIENT/OBSERVATION GOALS TO BE MET BEFORE DISCHARGE:  ADLs back to baseline: Yes    Activity and level of assistance: Ambulating independently.    Pain status: Pain free.    Return to near baseline physical activity: Yes     Discharge Planner Nurse   Safe discharge environment identified: Yes  Barriers to discharge: No       Entered by: Tiffani Babb RN 05/22/2024 10:16 PM       Problem: Adult Inpatient Plan of Care  Goal: Absence of Hospital-Acquired Illness or Injury  Intervention: Identify and Manage Fall Risk  Recent Flowsheet Documentation  Taken 5/22/2024 1713 by Tiffani Babb RN  Safety Promotion/Fall Prevention:   activity supervised   nonskid shoes/slippers when out of bed  Intervention: Prevent Infection  Recent Flowsheet Documentation  Taken 5/22/2024 1713 by Tiffani Babb, RN  Infection Prevention: hand hygiene promoted  Goal: Readiness for Transition of Care  Intervention: Mutually Develop Transition Plan  Recent Flowsheet Documentation  Taken 5/22/2024 1707 by Tiffani Babb, RN  Equipment Currently Used at Home: none       "

## 2024-05-23 NOTE — PLAN OF CARE
PRIMARY DIAGNOSIS: ANEMIA  OUTPATIENT/OBSERVATION GOALS TO BE MET BEFORE DISCHARGE:  ADLs back to baseline: Yes    Activity and level of assistance: Ambulating independently.  Stable Hgb Yes.   Recent Labs   Lab Test 05/22/24  2336 05/22/24  1924 05/22/24  1710   HGB 8.3* 7.0* 6.7*       Pain status: Pain free.    Return to near baseline physical activity: Yes     Discharge Planner Nurse   Safe discharge environment identified: Yes  Barriers to discharge: No       Entered by: Richelle Flores RN 05/23/2024 4:02 AM   Pt is alert and oriented x4 with language barrier.  needed. Daughter at bedside to assist with interpretation. Given Senna for constipation. Independent in room   Please review provider order for any additional goals.   Nurse to notify provider when observation goals have been met and patient is ready for discharge.  Goal Outcome Evaluation:      Plan of Care Reviewed With: patient    Overall Patient Progress: improvingOverall Patient Progress: improving    Outcome Evaluation: Hgb improved.

## 2024-05-23 NOTE — PLAN OF CARE
PRIMARY DIAGNOSIS: ANEMIA  OUTPATIENT/OBSERVATION GOALS TO BE MET BEFORE DISCHARGE:  ADLs back to baseline: Yes    Activity and level of assistance: Ambulating independently.  Stable Hgb Yes.   Recent Labs   Lab Test 05/22/24  2336 05/22/24  1924 05/22/24  1710   HGB 8.3* 7.0* 6.7*       Pain status: Pain free.    Return to near baseline physical activity: Yes     Discharge Planner Nurse   Safe discharge environment identified: Yes  Barriers to discharge: No       Entered by: Richelle Flores RN 05/23/2024 12:54 AM     Please review provider order for any additional goals.   Nurse to notify provider when observation goals have been met and patient is ready for discharge.  Goal Outcome Evaluation:      Plan of Care Reviewed With: patient    Overall Patient Progress: improvingOverall Patient Progress: improving    Outcome Evaluation: Hgb improved.

## 2024-05-23 NOTE — PLAN OF CARE
"PRIMARY DIAGNOSIS: Microcytic Anemia  OUTPATIENT/OBSERVATION GOALS TO BE MET BEFORE DISCHARGE:  ADLs back to baseline: Yes    Activity and level of assistance: Ambulating independently.    Pain status: Pain free.    Return to near baseline physical activity: Yes     Discharge Planner Nurse   Safe discharge environment identified: Yes  Barriers to discharge: No       Entered by: Nicole Wiley RN 05/23/2024 12:51 PM   Pt is A/Ox4. Denies pain or SOB. Denies dizziness. Tolerating oral intake. Iron infusion completed. Pt is discharging home today. Will continue to monitor.  Please review provider order for any additional goals.   Nurse to notify provider when observation goals have been met and patient is ready for discharge.  Problem: Adult Inpatient Plan of Care  Goal: Plan of Care Review  Description: The Plan of Care Review/Shift note should be completed every shift.  The Outcome Evaluation is a brief statement about your assessment that the patient is improving, declining, or no change.  This information will be displayed automatically on your shift  note.  Outcome: Adequate for Care Transition  Goal: Patient-Specific Goal (Individualized)  Description: You can add care plan individualizations to a care plan. Examples of Individualization might be:  \"Parent requests to be called daily at 9am for status\", \"I have a hard time hearing out of my right ear\", or \"Do not touch me to wake me up as it startles  me\".  Outcome: Adequate for Care Transition  Goal: Absence of Hospital-Acquired Illness or Injury  Outcome: Adequate for Care Transition  Intervention: Identify and Manage Fall Risk  Recent Flowsheet Documentation  Taken 5/23/2024 0936 by Nicole Wiley RN  Safety Promotion/Fall Prevention:   clutter free environment maintained   nonskid shoes/slippers when out of bed   activity supervised  Intervention: Prevent Skin Injury  Recent Flowsheet Documentation  Taken 5/23/2024 0936 by Nicole Wiley RN  Skin Protection: " adhesive use limited  Device Skin Pressure Protection: adhesive use limited  Intervention: Prevent and Manage VTE (Venous Thromboembolism) Risk  Recent Flowsheet Documentation  Taken 5/23/2024 0936 by Nicole Wiley RN  VTE Prevention/Management: SCDs (sequential compression devices) off  Intervention: Prevent Infection  Recent Flowsheet Documentation  Taken 5/23/2024 0936 by Nicole Wiley RN  Infection Prevention: single patient room provided  Goal: Optimal Comfort and Wellbeing  Outcome: Adequate for Care Transition  Goal: Readiness for Transition of Care  Outcome: Adequate for Care Transition

## 2024-05-23 NOTE — PLAN OF CARE
Patient's After Visit Summary was reviewed with patient and/or family.   Patient verbalized understanding of After Visit Summary, recommended follow up and was given an opportunity to ask questions. IV removed.   Discharge medications sent home with patient/family: No   Discharged with daughter

## 2024-05-23 NOTE — DISCHARGE SUMMARY
"[facility]  Hospitalist Discharge Summary      Date of Admission:  5/22/2024  Date of Discharge:  5/23/2024  Discharging Provider: CHUN Fontenot CNP  Discharge Service: Hospitalist Service    Discharge Diagnoses   See below    Clinically Significant Risk Factors     # Obesity: Estimated body mass index is 32.45 kg/m  as calculated from the following:    Height as of this encounter: 1.6 m (5' 3\").    Weight as of this encounter: 83.1 kg (183 lb 3.2 oz).       Follow-ups Needed After Discharge   Follow-up Appointments     Follow-up and recommended labs and tests       Follow up with primary care provider, Dulce Maria Hebert, within 7 days   for hospital follow- up.  The following labs/tests are recommended: CBC   and BMP.      Follow up with [facility] and consider dermatology as recommended by your   primary.            Unresulted Labs Ordered in the Past 30 Days of this Admission       Date and Time Order Name Status Description    5/22/2024  5:10 PM Bld morphology pathology review In process     5/22/2024  4:52 PM Prepare red blood cells (unit) Preliminary         These results will be followed up by HM/PCP    Discharge Disposition   Discharged to home  Condition at discharge: Stable    Hospital Course   Date of Admission: 5/22/2024  Date of Discharge: [insert date]      Diagnosis at Admission:    Iron deficiency anemia    Chronic dysphagia    Subclinical hypothyroidism    Chronic systemic itching with hair loss likely secondary to hypothyroidism and iron deficiency anemia    Obesity    Chronic back pain      Summary of Hospital Course:  The patient, Ms. Aiken, was admitted due to her history of iron deficiency anemia, dysphagia, systemic itching, and hair loss. During her stay, she was transfused with 2 unit of PRBCs and 1 dose of IV iron. She was monitored for signs of bleeding without further signs.. She was educated on iron-rich food sources and a daily iron supplementation was " initiated. Laboratory tests were ordered, including B12, ferritin, transferrin, iron binding capacity (all suggestive of microcytic anemia - iron deficiency anemia), and peripheral smear was pending at time of discharge. Her subclinical hypothyroidism was monitored and she is currently not on replacement. She would like to follow up with her PCP before starting replacement therapy. Her dysphagia was stable without any overt aspiration or significant difficulty and is planned to be further investigated in an outpatient setting, likely with EGD. Her systemic itching and hair loss, suspected to be related to her anemia and possibly hypothyroidism, are planned to be evaluated by her PCP and dermatologist.  However, in the interim she will trial using Aquaphor lotion and Lac-hydrin and PRN hydroxyzine.    See below for lab values.     Procedures Performed:  Blood transfusion  IV Iron infusion  Laboratory tests      Condition at Discharge:  Ms. Marrero condition is stable at the time of discharge.      Plan of Care:    Continue monitoring hemoglobin levels and start daily iron supplementation. Discussed side effects of iron supplementation.  Encourage adequate hydration and discuss constipating effects.     Schedule outpatient GI consult for further evaluation of dysphagia. She was provided with a card for [facility].    Schedule dermatology consultation for chronic systemic itching and hair loss if symptoms do not improve with treatment of anemia.     Continue monitoring for symptoms of hypothyroidism.  Follow up with PCP to discuss thyroid dose replacement.     Continue pain management for chronic back pain.    Continue weight management and healthy lifestyle changes for obesity.      Discharge Medications:  See below      Follow-up Appointments:  See below    Outpatient GI consult  Dermatology consultation  PCP follow up    Disposition:  The patient is discharged to home with instructions to follow the above plan of care  and to seek immediate medical attention if symptoms worsen or new symptoms develop.    Consultations This Hospital Stay   None    Code Status   Full Code    Time Spent on this Encounter   I, CHUN Fontenot CNP, personally saw the patient today and spent greater than 30 minutes discharging this patient.       CHUN Fontenot CNP  [facility] OBSERVATION DEPT  [address] [place] [address]  Phone: [phone]  ______________________________________________________________________    Physical Exam   Vital Signs: Temp: 98.1  F (36.7  C) Temp src: Oral BP: 104/48 Pulse: 62   Resp: 16 SpO2: 99 % O2 Device: None (Room air)    Weight: 183 lbs 3.24 oz  GEN:   Alert, oriented x 3, appears comfortable, NAD.  NECK:   Supple ,no mass or thyromegaly   HEENT:  Normocephalic/atraumatic, no scleral icterus, no nasal discharge, mouth moist.  CV:   Regular rate and rhythm, no murmur or JVD.  S1 + S2 noted, no S3 or S4.  LUNGS:   Clear to auscultation bilaterally without rales/rhonchi/wheezing/retractions.  Symmetric chest rise on inhalation noted.  ABD:   Active bowel sounds, soft, non-tender/non-distended.  No rebound/guarding/rigidity.  EXT:   No edema.  No cyanosis.  No joint synovitis noted.  SKIN:   Dry to touch, no exanthems noted in the visualized areas.  Neurologic: Grossly intact,non focal.  Neuropsychiatric:  General: normal, calm and normal eye contact  Level of consciousness: alert / normal  Affect: normal  Orientation: oriented to self, place, time and situation        Primary Care Physician   Dulce Maria [facility]    Discharge Orders      Reason for your hospital stay    Microcytic anemia (iron deficiency anemia)  Subclinical hypothyroidism  Dysphagia     Follow-up and recommended labs and tests     Follow up with primary care provider, Dulce Maria Hebert, within 7 days for hospital follow- up.  The following labs/tests are recommended: CBC and BMP.      Follow up with [facility]  and consider dermatology as recommended by your primary.     Activity    Your activity upon discharge: activity as tolerated     When to contact your care team    Call your primary doctor if you have any of the following: with any questions or concerns.     Diet    Follow this diet upon discharge: Orders Placed This Encounter      Regular Diet Adult       Significant Results and Procedures   Most Recent 3 CBC's:  Recent Labs   Lab Test 05/23/24  0518 05/22/24  2336 05/22/24  1924 05/22/24  1710 05/22/24  1402   WBC 6.4  --   --  5.3 4.9   HGB 8.2* 8.3* 7.0* 6.7* 6.1*   MCV 59*  --   --  56* 52*     --   --  156 156     Most Recent 3 BMP's:  Recent Labs   Lab Test 05/23/24  0518 05/22/24  1309    136   POTASSIUM 3.8 3.5   CHLORIDE 107 104   CO2 19* 21*   BUN 9.8 9.3   CR 0.49* 0.48*   ANIONGAP 10 11   BEATRIS 8.4* 8.5*   * 99     Most Recent 2 LFT's:  Recent Labs   Lab Test 05/22/24  1309   AST 15   ALT <5   ALKPHOS 63   BILITOTAL 0.7     7-Day Micro Results       No results found for the last 168 hours.          Iron studies:  B12 normal 352 pg/mL  Ferritin low 3 ng//ml  Transferrin normal  325 mg/dL  Iron low at 14 micrograms/dl  TIBC  normal 406 micrograms/dL  Iron saturation index low 3%    Most Recent Urinalysis:  Recent Labs   Lab Test 05/22/24  1834   COLOR Light Yellow   APPEARANCE Clear   URINEGLC Negative   URINEBILI Negative   URINEKETONE Negative   SG 1.009   UBLD Negative   URINEPH 7.5*   PROTEIN Negative   NITRITE Negative   LEUKEST Negative   RBCU 0   WBCU <1   ,   Results for orders placed or performed during the hospital encounter of 05/22/24   XR Chest 2 Views    Narrative    EXAM: XR CHEST 2 VIEWS  LOCATION: [facility]  DATE: 5/22/2024    INDICATION: Chest pain. Weakness. Fatigue.  COMPARISON: None.      Impression    IMPRESSION: Heart size upper limits of normal. Pulmonary vascularity is within normal limits. Lungs clear. No pleural effusions.       Discharge  Medications   Current Discharge Medication List        START taking these medications    Details   acetaminophen (TYLENOL) 325 MG tablet Take 2 tablets (650 mg) by mouth every 4 hours as needed for mild pain or other (and adjunct with moderate or severe pain or per patient request)    Associated Diagnoses: Microcytic anemia      ammonium lactate (LAC-HYDRIN) 12 % external lotion Apply topically 2 times daily  Qty: 225 g, Refills: PRN    Associated Diagnoses: Microcytic anemia      ferrous sulfate (FEROSUL) 325 (65 Fe) MG tablet Take 1 tablet (325 mg) by mouth daily (with breakfast)  Qty: 30 tablet, Refills: 3    Associated Diagnoses: Microcytic anemia      hydrOXYzine HCl (ATARAX) 25 MG tablet Take 1 tablet (25 mg) by mouth 3 times daily as needed for itching or anxiety  Qty: 30 tablet, Refills: 1    Associated Diagnoses: Microcytic anemia      ondansetron (ZOFRAN ODT) 4 MG ODT tab Take 1 tablet (4 mg) by mouth every 6 hours as needed for nausea or vomiting  Qty: 15 tablet, Refills: 0    Associated Diagnoses: Microcytic anemia      senna-docusate (SENOKOT-S/PERICOLACE) 8.6-50 MG tablet Take 1 tablet by mouth 2 times daily as needed for constipation  Qty: 60 tablet, Refills: 1    Associated Diagnoses: Microcytic anemia           Allergies   No Known Allergies

## 2024-05-25 LAB
PATH REPORT.COMMENTS IMP SPEC: NORMAL
PATH REPORT.FINAL DX SPEC: NORMAL
PATH REPORT.MICROSCOPIC SPEC OTHER STN: NORMAL
PATH REPORT.MICROSCOPIC SPEC OTHER STN: NORMAL
PATH REPORT.RELEVANT HX SPEC: NORMAL